# Patient Record
Sex: FEMALE | Race: WHITE | NOT HISPANIC OR LATINO | Employment: PART TIME | URBAN - NONMETROPOLITAN AREA
[De-identification: names, ages, dates, MRNs, and addresses within clinical notes are randomized per-mention and may not be internally consistent; named-entity substitution may affect disease eponyms.]

---

## 2017-09-19 ENCOUNTER — TRANSCRIBE ORDERS (OUTPATIENT)
Dept: ADMINISTRATIVE | Facility: HOSPITAL | Age: 27
End: 2017-09-19

## 2017-09-19 DIAGNOSIS — R22.31 LUMP OF AXILLA, RIGHT: Primary | ICD-10-CM

## 2017-09-26 ENCOUNTER — TRANSCRIBE ORDERS (OUTPATIENT)
Dept: ADMINISTRATIVE | Facility: HOSPITAL | Age: 27
End: 2017-09-26

## 2017-09-26 DIAGNOSIS — R22.31 AXILLARY LUMP, RIGHT: Primary | ICD-10-CM

## 2018-10-22 ENCOUNTER — OFFICE VISIT (OUTPATIENT)
Dept: PSYCHIATRY | Facility: CLINIC | Age: 28
End: 2018-10-22

## 2018-10-22 DIAGNOSIS — F33.1 MAJOR DEPRESSIVE DISORDER, RECURRENT EPISODE, MODERATE (HCC): Primary | ICD-10-CM

## 2018-10-22 DIAGNOSIS — F41.1 GENERALIZED ANXIETY DISORDER: ICD-10-CM

## 2018-10-22 DIAGNOSIS — F42.9 OBSESSIVE-COMPULSIVE DISORDER, UNSPECIFIED TYPE: ICD-10-CM

## 2018-10-22 PROCEDURE — 90791 PSYCH DIAGNOSTIC EVALUATION: CPT | Performed by: SOCIAL WORKER
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
## 2018-10-30 ENCOUNTER — OFFICE VISIT (OUTPATIENT)
Dept: PSYCHIATRY | Facility: CLINIC | Age: 28
End: 2018-10-30

## 2018-10-30 VITALS
HEART RATE: 66 BPM | WEIGHT: 206 LBS | SYSTOLIC BLOOD PRESSURE: 123 MMHG | BODY MASS INDEX: 36.5 KG/M2 | DIASTOLIC BLOOD PRESSURE: 82 MMHG | HEIGHT: 63 IN

## 2018-10-30 DIAGNOSIS — F33.1 MAJOR DEPRESSIVE DISORDER, RECURRENT EPISODE, MODERATE (HCC): ICD-10-CM

## 2018-10-30 DIAGNOSIS — Z79.899 MEDICATION MANAGEMENT: ICD-10-CM

## 2018-10-30 DIAGNOSIS — F42.9 OBSESSIVE-COMPULSIVE DISORDER, UNSPECIFIED TYPE: Primary | ICD-10-CM

## 2018-10-30 DIAGNOSIS — F41.1 GENERALIZED ANXIETY DISORDER: ICD-10-CM

## 2018-10-30 DIAGNOSIS — F51.05 INSOMNIA DUE TO MENTAL DISORDER: ICD-10-CM

## 2018-10-30 LAB
AMPHETAMINE CUT-OFF: 1000
BENZODIAZIPINE CUT-OFF: 300
BUPRENORPHINE CUT-OFF: 10
COCAINE CUT-OFF: 300
EXTERNAL AMPHETAMINE SCREEN URINE: NEGATIVE
EXTERNAL BENZODIAZEPINE SCREEN URINE: NEGATIVE
EXTERNAL BUPRENORPHINE SCREEN URINE: NEGATIVE
EXTERNAL COCAINE SCREEN URINE: NEGATIVE
EXTERNAL MDMA: NEGATIVE
EXTERNAL METHADONE SCREEN URINE: NEGATIVE
EXTERNAL METHAMPHETAMINE SCREEN URINE: NEGATIVE
EXTERNAL OPIATES SCREEN URINE: NEGATIVE
EXTERNAL OXYCODONE SCREEN URINE: NEGATIVE
EXTERNAL THC SCREEN URINE: NEGATIVE
MDMA CUT-OFF: 500
METHADONE CUT-OFF: 300
METHAMPHETAMINE CUT-OFF: 1000
OPIATES CUT-OFF: 300
OXYCODONE CUT-OFF: 100
THC CUT-OFF: 50

## 2018-10-30 PROCEDURE — 99214 OFFICE O/P EST MOD 30 MIN: CPT | Performed by: NURSE PRACTITIONER

## 2018-10-30 RX ORDER — FLUOXETINE HYDROCHLORIDE 20 MG/1
20 CAPSULE ORAL EVERY MORNING
Qty: 30 CAPSULE | Refills: 1 | Status: SHIPPED | OUTPATIENT
Start: 2018-10-30

## 2018-10-30 RX ORDER — FLUOXETINE 10 MG/1
10 CAPSULE ORAL EVERY MORNING
Qty: 30 CAPSULE | Refills: 1 | Status: SHIPPED | OUTPATIENT
Start: 2018-10-30 | End: 2018-11-30 | Stop reason: SDUPTHER

## 2018-10-30 RX ORDER — FLUOXETINE HYDROCHLORIDE 20 MG/1
20 CAPSULE ORAL DAILY
COMMUNITY
End: 2018-10-30 | Stop reason: SDUPTHER

## 2018-10-30 RX ORDER — TRAZODONE HYDROCHLORIDE 50 MG/1
25-50 TABLET ORAL NIGHTLY PRN
Qty: 30 TABLET | Refills: 0 | Status: SHIPPED | OUTPATIENT
Start: 2018-10-30

## 2018-10-30 NOTE — PROGRESS NOTES
"    Subjective   Gracia Mckinley is a 28 y.o. female is here today for medication management follow-up.    Chief Complaint: Depression and Anxiety     History of Present Illness   Patient presents to the clinic today to be evaluated for medication. Patient referred by Lelia Rodriguez LCSW. Patient experiencing worsening of depression and anxiety. She has taken Zoloft and Celexa in the past without benefit. She reports she was started on Prozac in September. She reports some benefit with the Prozac. She reports feeling calmer. Patient reports she is always late for everything because things have to be in perfect order at home and she is always cleaning. She reports she uses a gallon of bleach a week cleaning. She reports she vacuums three times daily and vacuums her car out three times weekly. She reports her kids toys have to be arranged a certain way. She reports this keeps her from allowing her children to play as children should and it keeps her from spending time and enjoying time with her children. She reports she lies in bed at night worrying. She reports she can't fall asleep for a couple of hours and when she does she sleeps 5-6 hours at night. She reports she worries about her children getting diagnosed with cancer or what would happen if she was in a car wreck. She reports she has three children 8 months old, 2 and 5. She reports she is caring for them mostly by herself. She reports her and her  have been \"on and off\" the past couple of years. She denies SI/HI/AH/VH.     The following portions of the patient's history were reviewed and updated as appropriate: allergies, current medications, past family history, past medical history, past social history, past surgical history and problem list.    Review of Systems   Constitutional: Negative.    HENT: Negative.    Eyes: Negative.    Respiratory: Negative.    Cardiovascular: Negative.    Gastrointestinal: Negative.    Endocrine: Negative.  " "  Genitourinary: Negative.    Musculoskeletal: Negative.    Skin: Negative.    Allergic/Immunologic: Negative.    Neurological: Negative.    Hematological: Negative.    Psychiatric/Behavioral: Positive for sleep disturbance. The patient is nervous/anxious.        Objective   Physical Exam   Constitutional: She appears well-developed and well-nourished. No distress.   Skin: She is not diaphoretic.   Vitals reviewed.    Blood pressure 123/82, pulse 66, height 160 cm (63\"), weight 93.4 kg (206 lb).    Medication List:   Current Outpatient Prescriptions   Medication Sig Dispense Refill   • FLUoxetine (PROzac) 20 MG capsule Take 1 capsule by mouth Every Morning. 30 capsule 1   • FLUoxetine (PROzac) 10 MG capsule Take 1 capsule by mouth Every Morning. 30 capsule 1   • traZODone (DESYREL) 50 MG tablet Take 0.5-1 tablets by mouth At Night As Needed for Sleep. 30 tablet 0     No current facility-administered medications for this visit.        Labs:   Recent Results (from the past 2016 hour(s))   [facility] Drug Screen    Collection Time: 10/30/18  1:20 PM   Result Value Ref Range    External Amphetamine Screen Urine Negative     Amphetamine Cut-Off 1,000     External Benzodiazepine Screen Urine Negative     Benzodiazipine Cut-Off 300     External Cocaine Screen Urine Negative     Cocaine Cut-Off 300     External THC Screen Urine Negative     THC Cut-Off 50     External Methadone Screen Urine Negative     Methadone Cut-Off 300     External Methamphetamine Screen Urine Negative     Methamphetamine Cut-Off 1,000     External Oxycodone Screen Urine Negative     Oxycodone Cut-Off 100     External Buprenorphine Screen Urine Negative     Buprenorphine Cut-Off 10     External MDMA Negative     MDMA Cut-Off 500     External Opiates Screen Urine Negative     Opiates Cut-Off 300          Mental Status Exam:   Hygiene:   good  Cooperation:  Cooperative  Eye Contact:  Good  Psychomotor Behavior:  Appropriate  Affect:  " Restricted  Hopelessness: 4  Speech:  Normal  Thought Process:  Goal directed and Linear  Thought Content:  Mood congurent  Suicidal:  None  Homicidal:  None  Hallucinations:  None  Delusion:  None  Memory:  Intact  Orientation:  Person, Place, Time and Situation  Reliability:  good  Insight:  Good  Judgement:  Good  Impulse Control:  Good  Physical/Medical Issues:  No     Assessment/Plan   Diagnoses and all orders for this visit:    Obsessive-compulsive disorder, unspecified type  -     FLUoxetine (PROzac) 10 MG capsule; Take 1 capsule by mouth Every Morning.  -     FLUoxetine (PROzac) 20 MG capsule; Take 1 capsule by mouth Every Morning.    Medication management  -     [facility] Drug Screen    Major depressive disorder, recurrent episode, moderate (CMS/HCC)  -     FLUoxetine (PROzac) 10 MG capsule; Take 1 capsule by mouth Every Morning.  -     FLUoxetine (PROzac) 20 MG capsule; Take 1 capsule by mouth Every Morning.    Generalized anxiety disorder  -     FLUoxetine (PROzac) 10 MG capsule; Take 1 capsule by mouth Every Morning.  -     FLUoxetine (PROzac) 20 MG capsule; Take 1 capsule by mouth Every Morning.    Insomnia due to mental disorder  -     traZODone (DESYREL) 50 MG tablet; Take 0.5-1 tablets by mouth At Night As Needed for Sleep.        UDS - negative    Body mass index is 36.49 kg/m².  Patient was educated on healthier and more balanced diet choices and encouraged exercise within physical limitations.  Functionality: pt having significant impairment in important areas of daily functioning.  Prognosis: Good dependent on medication/follow up and treatment plan compliance.    Impression: Patient experiencing worsening of depression and anxiety over the past few months. Excessive worrying and OCD behaviors interfering with home, work, social life and sleep.     Plan:  1) Increase Prozac to 30 mg po daily as patient feels that she has received some benefit from the Prozac for depression and anxiety.   2) Start  Trazodone 50 mg 1/2-1 tablet po QHS for sleep disturbance.   3) Continue psychotherapy with Lelia Rodriguez LCSW  4) RTC in 4 weeks     Discussed medication options.  Reviewed the risks, benefits, and side effects of the medications; patient acknowledged and verbally consented.  Patient is agreeable to call [facility].  Patient is aware to call 911 or go to the nearest ER should begin having SI/HI.

## 2018-11-19 ENCOUNTER — HOSPITAL ENCOUNTER (EMERGENCY)
Facility: HOSPITAL | Age: 28
Discharge: HOME | End: 2018-11-19
Attending: EMERGENCY MEDICINE | Admitting: EMERGENCY MEDICINE

## 2018-11-19 ENCOUNTER — APPOINTMENT (OUTPATIENT)
Dept: CT IMAGING | Facility: HOSPITAL | Age: 28
End: 2018-11-19

## 2018-11-19 VITALS
TEMPERATURE: 98.1 F | OXYGEN SATURATION: 99 % | SYSTOLIC BLOOD PRESSURE: 124 MMHG | HEIGHT: 63 IN | DIASTOLIC BLOOD PRESSURE: 83 MMHG | HEART RATE: 65 BPM | BODY MASS INDEX: 35.79 KG/M2 | WEIGHT: 202 LBS | RESPIRATION RATE: 16 BRPM

## 2018-11-19 DIAGNOSIS — K81.0 ACUTE CHOLECYSTITIS: Primary | ICD-10-CM

## 2018-11-19 DIAGNOSIS — R91.1 PULMONARY NODULE: ICD-10-CM

## 2018-11-19 LAB
ALBUMIN SERPL-MCNC: 4.5 G/DL (ref 3.5–5)
ALBUMIN/GLOB SERPL: 1.9 G/DL (ref 1.5–2.5)
ALP SERPL-CCNC: 77 U/L (ref 35–104)
ALT SERPL W P-5'-P-CCNC: 20 U/L (ref 10–36)
ANION GAP SERPL CALCULATED.3IONS-SCNC: 4.7 MMOL/L (ref 3.6–11.2)
AST SERPL-CCNC: 20 U/L (ref 10–30)
B-HCG UR QL: NEGATIVE
BASOPHILS # BLD AUTO: 0.02 10*3/MM3 (ref 0–0.3)
BASOPHILS NFR BLD AUTO: 0.2 % (ref 0–2)
BILIRUB SERPL-MCNC: 0.2 MG/DL (ref 0.2–1.8)
BILIRUB UR QL STRIP: NEGATIVE
BUN BLD-MCNC: 14 MG/DL (ref 7–21)
BUN/CREAT SERPL: 20.9 (ref 7–25)
CALCIUM SPEC-SCNC: 9.1 MG/DL (ref 7.7–10)
CHLORIDE SERPL-SCNC: 110 MMOL/L (ref 99–112)
CLARITY UR: CLEAR
CO2 SERPL-SCNC: 25.3 MMOL/L (ref 24.3–31.9)
COLOR UR: YELLOW
CREAT BLD-MCNC: 0.67 MG/DL (ref 0.43–1.29)
DEPRECATED RDW RBC AUTO: 38.8 FL (ref 37–54)
EOSINOPHIL # BLD AUTO: 0.33 10*3/MM3 (ref 0–0.7)
EOSINOPHIL NFR BLD AUTO: 3.3 % (ref 0–5)
ERYTHROCYTE [DISTWIDTH] IN BLOOD BY AUTOMATED COUNT: 14.5 % (ref 11.5–14.5)
GFR SERPL CREATININE-BSD FRML MDRD: 106 ML/MIN/1.73
GLOBULIN UR ELPH-MCNC: 2.4 GM/DL
GLUCOSE BLD-MCNC: 94 MG/DL (ref 70–110)
GLUCOSE UR STRIP-MCNC: NEGATIVE MG/DL
HCT VFR BLD AUTO: 35.7 % (ref 37–47)
HGB BLD-MCNC: 11.3 G/DL (ref 12–16)
HGB UR QL STRIP.AUTO: NEGATIVE
IMM GRANULOCYTES # BLD: 0.03 10*3/MM3 (ref 0–0.03)
IMM GRANULOCYTES NFR BLD: 0.3 % (ref 0–0.5)
KETONES UR QL STRIP: NEGATIVE
LEUKOCYTE ESTERASE UR QL STRIP.AUTO: NEGATIVE
LIPASE SERPL-CCNC: 35 U/L (ref 13–60)
LYMPHOCYTES # BLD AUTO: 2.29 10*3/MM3 (ref 1–3)
LYMPHOCYTES NFR BLD AUTO: 22.9 % (ref 21–51)
MCH RBC QN AUTO: 24.4 PG (ref 27–33)
MCHC RBC AUTO-ENTMCNC: 31.7 G/DL (ref 33–37)
MCV RBC AUTO: 76.9 FL (ref 80–94)
MONOCYTES # BLD AUTO: 0.68 10*3/MM3 (ref 0.1–0.9)
MONOCYTES NFR BLD AUTO: 6.8 % (ref 0–10)
NEUTROPHILS # BLD AUTO: 6.66 10*3/MM3 (ref 1.4–6.5)
NEUTROPHILS NFR BLD AUTO: 66.5 % (ref 30–70)
NITRITE UR QL STRIP: NEGATIVE
OSMOLALITY SERPL CALC.SUM OF ELEC: 279.6 MOSM/KG (ref 273–305)
PH UR STRIP.AUTO: 7 [PH] (ref 5–8)
PLATELET # BLD AUTO: 247 10*3/MM3 (ref 130–400)
PMV BLD AUTO: 11.6 FL (ref 6–10)
POTASSIUM BLD-SCNC: 3.7 MMOL/L (ref 3.5–5.3)
PROT SERPL-MCNC: 6.9 G/DL (ref 6–8)
PROT UR QL STRIP: NEGATIVE
RBC # BLD AUTO: 4.64 10*6/MM3 (ref 4.2–5.4)
SODIUM BLD-SCNC: 140 MMOL/L (ref 135–153)
SP GR UR STRIP: 1.02 (ref 1–1.03)
UROBILINOGEN UR QL STRIP: NORMAL
WBC NRBC COR # BLD: 10.01 10*3/MM3 (ref 4.5–12.5)

## 2018-11-19 PROCEDURE — 25010000002 ONDANSETRON PER 1 MG: Performed by: PHYSICIAN ASSISTANT

## 2018-11-19 PROCEDURE — 81025 URINE PREGNANCY TEST: CPT | Performed by: PHYSICIAN ASSISTANT

## 2018-11-19 PROCEDURE — 81003 URINALYSIS AUTO W/O SCOPE: CPT | Performed by: PHYSICIAN ASSISTANT

## 2018-11-19 PROCEDURE — 80053 COMPREHEN METABOLIC PANEL: CPT | Performed by: PHYSICIAN ASSISTANT

## 2018-11-19 PROCEDURE — 83690 ASSAY OF LIPASE: CPT | Performed by: PHYSICIAN ASSISTANT

## 2018-11-19 PROCEDURE — 74177 CT ABD & PELVIS W/CONTRAST: CPT | Performed by: RADIOLOGY

## 2018-11-19 PROCEDURE — 25010000002 IOPAMIDOL 61 % SOLUTION: Performed by: EMERGENCY MEDICINE

## 2018-11-19 PROCEDURE — 99283 EMERGENCY DEPT VISIT LOW MDM: CPT

## 2018-11-19 PROCEDURE — 96374 THER/PROPH/DIAG INJ IV PUSH: CPT

## 2018-11-19 PROCEDURE — 85025 COMPLETE CBC W/AUTO DIFF WBC: CPT | Performed by: PHYSICIAN ASSISTANT

## 2018-11-19 PROCEDURE — 74177 CT ABD & PELVIS W/CONTRAST: CPT

## 2018-11-19 RX ORDER — ONDANSETRON 2 MG/ML
4 INJECTION INTRAMUSCULAR; INTRAVENOUS ONCE
Status: COMPLETED | OUTPATIENT
Start: 2018-11-19 | End: 2018-11-19

## 2018-11-19 RX ORDER — ONDANSETRON 4 MG/1
4 TABLET, ORALLY DISINTEGRATING ORAL EVERY 8 HOURS PRN
Qty: 12 TABLET | Refills: 0 | Status: SHIPPED | OUTPATIENT
Start: 2018-11-19

## 2018-11-19 RX ORDER — SODIUM CHLORIDE 0.9 % (FLUSH) 0.9 %
10 SYRINGE (ML) INJECTION AS NEEDED
Status: DISCONTINUED | OUTPATIENT
Start: 2018-11-19 | End: 2018-11-20 | Stop reason: HOSPADM

## 2018-11-19 RX ADMIN — ONDANSETRON 4 MG: 2 INJECTION INTRAMUSCULAR; INTRAVENOUS at 20:53

## 2018-11-19 RX ADMIN — IOPAMIDOL 100 ML: 612 INJECTION, SOLUTION INTRAVENOUS at 23:06

## 2018-11-20 NOTE — ED PROVIDER NOTES
Subjective     History provided by:  Patient   used: No    Abdominal Pain   Pain location:  RUQ  Pain quality: cramping and squeezing    Pain radiates to:  Back and epigastric region  Pain severity:  Moderate  Duration:  8 hours  Timing:  Constant  Progression:  Worsening  Chronicity:  New  Context: eating    Context: not awakening from sleep, not previous surgeries, not recent travel and not suspicious food intake    Context comment:  Pain began after eating spicy mexican food at lunch today  Relieved by:  Nothing  Worsened by:  Eating  Ineffective treatments:  NSAIDs  Associated symptoms: nausea and vomiting    Associated symptoms: no chest pain, no chills, no dysuria, no fever, no hematuria and no vaginal discharge    Risk factors: obesity    Risk factors: not elderly and not pregnant        Review of Systems   Constitutional: Negative.  Negative for chills and fever.   HENT: Negative.    Respiratory: Negative.    Cardiovascular: Negative.  Negative for chest pain.   Gastrointestinal: Positive for abdominal pain, nausea and vomiting.   Endocrine: Negative.    Genitourinary: Negative for decreased urine volume, dysuria, flank pain, hematuria and vaginal discharge.   Musculoskeletal: Positive for back pain.   Skin: Negative.    Neurological: Negative.    Psychiatric/Behavioral: Negative.    All other systems reviewed and are negative.      Past Medical History:   Diagnosis Date   • Anxiety    • Depression        No Known Allergies    Past Surgical History:   Procedure Laterality Date   •  SECTION         Family History   Problem Relation Age of Onset   • No Known Problems Mother    • No Known Problems Father        Social History     Socioeconomic History   • Marital status: Unknown     Spouse name: Not on file   • Number of children: Not on file   • Years of education: Not on file   • Highest education level: Not on file   Tobacco Use   • Smoking status: Never Smoker   • Smokeless  tobacco: Never Used   Substance and Sexual Activity   • Alcohol use: No   • Drug use: No   • Sexual activity: Defer           Objective   Physical Exam   Constitutional: She is oriented to person, place, and time. She appears well-developed and well-nourished. No distress.   HENT:   Head: Normocephalic and atraumatic.   Right Ear: External ear normal.   Left Ear: External ear normal.   Nose: Nose normal.   Eyes: Conjunctivae and EOM are normal. Pupils are equal, round, and reactive to light.   Neck: Normal range of motion. Neck supple. No JVD present. No tracheal deviation present.   Cardiovascular: Normal rate, regular rhythm and normal heart sounds.   No murmur heard.  Pulmonary/Chest: Effort normal and breath sounds normal. No respiratory distress. She has no wheezes.   Abdominal: Soft. Bowel sounds are normal. There is tenderness in the right upper quadrant and epigastric area. There is positive Steve's sign. There is no CVA tenderness and no tenderness at McBurney's point.   Musculoskeletal: Normal range of motion. She exhibits no edema or deformity.        Lumbar back: She exhibits pain.        Back:    Neurological: She is alert and oriented to person, place, and time. No cranial nerve deficit.   Skin: Skin is warm and dry. No rash noted. She is not diaphoretic. No erythema. No pallor.   Psychiatric: She has a normal mood and affect. Her behavior is normal. Thought content normal.   Nursing note and vitals reviewed.      Procedures           ED Course  ED Course as of Nov 19 2316   Mon Nov 19, 2018   2258 Per [facility], findings concerning for acute cholecystitis. Pulmonary nodule. CT Abdomen Pelvis With Contrast [TK]   2306 Discussed pt with Dr. Garcia, agreeable pt may follow up as an outpatient with Dr. Wilcox. However should symptoms worsen or she has intractable N/V, she will return to the ED for further evaluation. Pt agreeable to do so.  [TK]      ED Course User Index  [TK] Boris Felix PA-C                   MDM  Number of Diagnoses or Management Options  Acute cholecystitis: new and requires workup  Pulmonary nodule: new and requires workup     Amount and/or Complexity of Data Reviewed  Clinical lab tests: reviewed and ordered  Tests in the radiology section of CPT®: reviewed and ordered  Discuss the patient with other providers: yes (Jose)    Risk of Complications, Morbidity, and/or Mortality  Presenting problems: moderate  Diagnostic procedures: moderate  Management options: moderate    Patient Progress  Patient progress: stable        Final diagnoses:   Acute cholecystitis   Pulmonary nodule            Boris Felix PA-C  11/19/18 2316       Boris Felix PA-C  11/19/18 2317

## 2018-11-30 DIAGNOSIS — F33.1 MAJOR DEPRESSIVE DISORDER, RECURRENT EPISODE, MODERATE (HCC): ICD-10-CM

## 2018-11-30 DIAGNOSIS — F41.1 GENERALIZED ANXIETY DISORDER: ICD-10-CM

## 2018-11-30 DIAGNOSIS — F42.9 OBSESSIVE-COMPULSIVE DISORDER, UNSPECIFIED TYPE: ICD-10-CM

## 2018-12-03 RX ORDER — FLUOXETINE 10 MG/1
CAPSULE ORAL
Qty: 30 CAPSULE | Refills: 0 | Status: SHIPPED | OUTPATIENT
Start: 2018-12-03

## 2019-12-05 ENCOUNTER — OFFICE VISIT (OUTPATIENT)
Dept: NEUROSURGERY | Facility: CLINIC | Age: 29
End: 2019-12-05

## 2019-12-05 VITALS
HEART RATE: 77 BPM | RESPIRATION RATE: 18 BRPM | TEMPERATURE: 97 F | DIASTOLIC BLOOD PRESSURE: 87 MMHG | OXYGEN SATURATION: 98 % | WEIGHT: 210 LBS | HEIGHT: 64 IN | BODY MASS INDEX: 35.85 KG/M2 | SYSTOLIC BLOOD PRESSURE: 124 MMHG

## 2019-12-05 DIAGNOSIS — M51.36 DEGENERATIVE DISC DISEASE, LUMBAR: ICD-10-CM

## 2019-12-05 DIAGNOSIS — M51.34 DEGENERATIVE DISC DISEASE, THORACIC: Primary | ICD-10-CM

## 2019-12-05 PROCEDURE — 99213 OFFICE O/P EST LOW 20 MIN: CPT | Performed by: NEUROLOGICAL SURGERY

## 2019-12-05 RX ORDER — HYDROCODONE BITARTRATE AND ACETAMINOPHEN 5; 325 MG/1; MG/1
1 TABLET ORAL EVERY 6 HOURS PRN
COMMUNITY

## 2019-12-05 RX ORDER — NABUMETONE 750 MG/1
750 TABLET, FILM COATED ORAL 2 TIMES DAILY
Qty: 60 TABLET | Refills: 1 | Status: SHIPPED | OUTPATIENT
Start: 2019-12-05

## 2019-12-05 RX ORDER — IBUPROFEN 800 MG/1
TABLET ORAL
Refills: 0 | COMMUNITY
Start: 2019-11-05

## 2019-12-05 NOTE — PROGRESS NOTES
Gracia Mckinley  1990  [id number]                        CHIEF COMPLAINT:  [ ]         MEDICAL HISTORY SINCE LAST ENCOUNTER:  [ ]           Past Medical History:   Diagnosis Date   • Anxiety    • Depression               Past Surgical History:   Procedure Laterality Date   •  SECTION                Family History   Problem Relation Age of Onset   • No Known Problems Mother    • No Known Problems Father               Social History     Socioeconomic History   • Marital status: Unknown     Spouse name: Not on file   • Number of children: Not on file   • Years of education: Not on file   • Highest education level: Not on file   Tobacco Use   • Smoking status: Never Smoker   • Smokeless tobacco: Never Used   Substance and Sexual Activity   • Alcohol use: No   • Drug use: No   • Sexual activity: Defer            No Known Allergies           Current Outpatient Medications:   •  aluminum-magnesium hydroxide-simethicone 400-400-40 MG/5ML suspension 15 mL, lidocaine viscous 2 % solution 10 mL, PB-Hyoscy-Atropine-Scopolamine 16.2 MG/5ML elixir 10 mL, Take 5 mL by mouth As Needed (for abdominal pain)., Disp: 50 mL, Rfl: 0  •  FLUoxetine (PROzac) 10 MG capsule, TAKE ONE CAPSULE BY MOUTH EVERY MORNING, Disp: 30 capsule, Rfl: 0  •  FLUoxetine (PROzac) 20 MG capsule, Take 1 capsule by mouth Every Morning., Disp: 30 capsule, Rfl: 1  •  HYDROcodone-acetaminophen (NORCO) 5-325 MG per tablet, Take 1 tablet by mouth Every 6 (Six) Hours As Needed., Disp: , Rfl:   •  ibuprofen (ADVIL,MOTRIN) 800 MG tablet, TK 1 T PO  TID PRN, Disp: , Rfl: 0  •  ondansetron ODT (ZOFRAN-ODT) 4 MG disintegrating tablet, Take 1 tablet by mouth Every 8 (Eight) Hours As Needed for Nausea or Vomiting., Disp: 12 tablet, Rfl: 0  •  traZODone (DESYREL) 50 MG tablet, Take 0.5-1 tablets by mouth At Night As Needed for Sleep., Disp: 30 tablet, Rfl: 0         Review of Systems   Musculoskeletal: Positive for arthralgias, back pain and myalgias.   All other  "systems reviewed and are negative.              Vitals:    12/05/19 1548   BP: 124/87   Pulse: 77   Resp: 18   Temp: 97 °F (36.1 °C)   SpO2: 98%   Weight: 95.3 kg (210 lb)   Height: 162.6 cm (64\")               EXAMINATION: [ ]            MEDICAL DECISION MAKING: [ ]           ASSESSMENT/DISPOSITION: [ ]              I APPRECIATE THE OPPORTUNITY OF THIS REFERRAL. PLEASE CALL IF ANY       QUESTIONS [phone]    "

## 2019-12-05 NOTE — PROGRESS NOTES
"Gracia Mckinley  1990  [id number]          HISTORY OF PRESENT ILLNESS: This is a 29-year-old female who states she has had pain in her back \"all of her life.  She was seen by neurosurgery approximately 10 years ago at which time a kyphoplasty was recommended for what was interpreted to being a compression fracture of recent origin at T10.  This was not carried out.  She has had problems since that time although has given birth to 3 children.  She notes that when she sits for prolonged period of time she has pain in the thoracic and lumbar region.  Her more recent pain is in the lumbosacral area more in the sacroiliac joint than the hip and leg.  She has no radiculopathy.  She has no symptoms to suggest nerve root or spinal cord compression.  Her thoracic MRIs been repeated and she is referred for neurosurgical consultation.  She has recently attended pain management.    Past Medical History:   Diagnosis Date   • Anxiety    • Depression        Past Surgical History:   Procedure Laterality Date   •  SECTION         Family History   Problem Relation Age of Onset   • No Known Problems Mother    • No Known Problems Father        Social History     Socioeconomic History   • Marital status: Unknown     Spouse name: Not on file   • Number of children: Not on file   • Years of education: Not on file   • Highest education level: Not on file   Tobacco Use   • Smoking status: Never Smoker   • Smokeless tobacco: Never Used   Substance and Sexual Activity   • Alcohol use: No   • Drug use: No   • Sexual activity: Defer       No Known Allergies      Current Outpatient Medications:   •  aluminum-magnesium hydroxide-simethicone 400-400-40 MG/5ML suspension 15 mL, lidocaine viscous 2 % solution 10 mL, PB-Hyoscy-Atropine-Scopolamine 16.2 MG/5ML elixir 10 mL, Take 5 mL by mouth As Needed (for abdominal pain)., Disp: 50 mL, Rfl: 0  •  FLUoxetine (PROzac) 10 MG capsule, TAKE ONE CAPSULE BY MOUTH EVERY MORNING, Disp: 30 capsule, " "Rfl: 0  •  FLUoxetine (PROzac) 20 MG capsule, Take 1 capsule by mouth Every Morning., Disp: 30 capsule, Rfl: 1  •  HYDROcodone-acetaminophen (NORCO) 5-325 MG per tablet, Take 1 tablet by mouth Every 6 (Six) Hours As Needed., Disp: , Rfl:   •  ibuprofen (ADVIL,MOTRIN) 800 MG tablet, TK 1 T PO  TID PRN, Disp: , Rfl: 0  •  ondansetron ODT (ZOFRAN-ODT) 4 MG disintegrating tablet, Take 1 tablet by mouth Every 8 (Eight) Hours As Needed for Nausea or Vomiting., Disp: 12 tablet, Rfl: 0  •  traZODone (DESYREL) 50 MG tablet, Take 0.5-1 tablets by mouth At Night As Needed for Sleep., Disp: 30 tablet, Rfl: 0    Review of Systems   Musculoskeletal: Positive for arthralgias and back pain.       Vitals:    12/05/19 1548   BP: 124/87   Pulse: 77   Resp: 18   Temp: 97 °F (36.1 °C)   SpO2: 98%   Weight: 95.3 kg (210 lb)   Height: 162.6 cm (64\")       Neurological Examination:  Mental status/speech: The patient is alert and oriented.  Speech is clear without aphysia or dysarthria.  No overt cognitive deficits.    Cranial nerve examination:    Olfaction: Smell is intact.  Vision: Vision is intact without visual field abnormalities.  Funduscopic examination is normal.  No pupillary irregularity.  Ocular motor examination: The extraocular muscles are intact.  There is no diplopia.  The pupil is round and reactive to both light and accommodation.  There is no nystagmus.  Facial movement/sensation: There is no facial weakness.  Sensation is intact in the first, second, and third divisions of the trigeminal nerve.  The corneal reflex is intact.  Auditory: Hearing is intact to finger rub bilaterally.  Cranial nerves IX, X, XI, XII: Phonation is normal.  No dysphagia.  Tongue is protruded in the midline without atrophy.  The gag reflex is intact.  Shoulder shrug is normal.    Musculoligamentous ligamentous examination: BMI is 36.1.  She has very poor core strength.  She has limitation of range of motion of her spine secondary to pain which is " axial.  Straight leg raising, Lasègue and flip test are all negative.  I am unable to document weakness, sensory loss or reflex asymmetry.            Medical Decision Making:     Diagnostic Data Set: I reviewed the thoracic MRI.  This shows abnormalities at T10 however this appears to be congenital rather than traumatic.  There are no signs of an acute injury.  Spinal canal is intact with no compression of the spinal cord.  MRI of the lumbar spine has not been performed      Assessment: Congenital anomaly T10; mild degenerative disc disease          Recommendations: I do not believe that she has an acute compression fracture of T10.  Similar findings were noted on the MRI approximately 10 years ago.  This is not changed.  I have ordered a lumbar MRI; CT of the thoracic spine for clarification and have suggested a course of physical therapy.  Given her prescription of Relafen 10 mg twice daily.  Unfortunately I think she will always have issues given the longevity of the symptoms she has had thus far.  I will continue to follow, however.  I can help further do not hesitate to contact me.  At the present time I think we need to see how she does with physical therapy and follow-up.        I greatly appreciate the opportunity to see and evaluate this individual.  If you have questions or concerns regarding issues that I may have overlooked please call me at any time: [phone].  Dariel Barron M.D.  [facility]  [address] [place] [address]  Scribed for Joaquín Barron MD by Rae Kaur CMA. 12/5/2019  4:07 PM  I have read and concur with the information provided by the scribe.  Joaquín Barron MD

## 2019-12-30 ENCOUNTER — DOCUMENTATION (OUTPATIENT)
Dept: NEUROSURGERY | Facility: CLINIC | Age: 29
End: 2019-12-30

## 2019-12-30 DIAGNOSIS — M51.36 DEGENERATIVE DISC DISEASE, LUMBAR: Primary | ICD-10-CM

## 2019-12-30 DIAGNOSIS — G89.29 CHRONIC BILATERAL LOW BACK PAIN WITHOUT SCIATICA: ICD-10-CM

## 2019-12-30 DIAGNOSIS — M54.50 CHRONIC BILATERAL LOW BACK PAIN WITHOUT SCIATICA: ICD-10-CM

## 2019-12-30 DIAGNOSIS — M51.34 DEGENERATIVE DISC DISEASE, THORACIC: ICD-10-CM

## 2019-12-30 DIAGNOSIS — G89.29 CHRONIC SI JOINT PAIN: ICD-10-CM

## 2019-12-30 DIAGNOSIS — M53.3 CHRONIC SI JOINT PAIN: ICD-10-CM

## 2019-12-30 NOTE — PROGRESS NOTES
12/30/2019    Insurance has denied the lumbar MRI scan and has recommended 6 weeks of physical therapy.  An order has been placed in [facility] for physical therapy.  I have left a message on the patient's voicemail regarding the denial and the need for 6 weeks of physical therapy, physical therapy can be done close to home for 6 weeks and she is to call us when she has completed her therapy.    Rashida Olivas PA-C

## 2020-01-10 ENCOUNTER — DOCUMENTATION (OUTPATIENT)
Dept: NEUROSURGERY | Facility: CLINIC | Age: 30
End: 2020-01-10